# Patient Record
Sex: MALE | Race: WHITE | URBAN - METROPOLITAN AREA
[De-identification: names, ages, dates, MRNs, and addresses within clinical notes are randomized per-mention and may not be internally consistent; named-entity substitution may affect disease eponyms.]

---

## 2019-01-08 ENCOUNTER — TRANSFERRED RECORDS (OUTPATIENT)
Dept: HEALTH INFORMATION MANAGEMENT | Facility: CLINIC | Age: 64
End: 2019-01-08

## 2019-01-17 ENCOUNTER — TELEPHONE (OUTPATIENT)
Dept: DERMATOLOGY | Facility: CLINIC | Age: 64
End: 2019-01-17

## 2019-01-17 NOTE — TELEPHONE ENCOUNTER
Medical records requested from [facility]. Direct fax number given.

## 2019-01-17 NOTE — TELEPHONE ENCOUNTER
is full and [employer] is inactive. Will try to call back at a later time.

## 2019-01-17 NOTE — TELEPHONE ENCOUNTER
[facility] Call Center    Phone Message    May a detailed message be left on voicemail: yes    Reason for Call: Other: Dr Virgen is referring pt here to remove lentigo maligna on forehead. Please reach out to pt for scheduling     Action Taken: Message routed to:  [facility] ([facility] derm

## 2019-01-22 ENCOUNTER — PATIENT OUTREACH (OUTPATIENT)
Dept: CARE COORDINATION | Facility: CLINIC | Age: 64
End: 2019-01-22

## 2019-01-22 ENCOUNTER — OFFICE VISIT (OUTPATIENT)
Dept: DERMATOLOGY | Facility: CLINIC | Age: 64
End: 2019-01-22
Payer: COMMERCIAL

## 2019-01-22 DIAGNOSIS — C43.9 LENTIGO MALIGNA MELANOMA (H): Primary | ICD-10-CM

## 2019-01-22 RX ORDER — ALLOPURINOL 300 MG/1
300 TABLET ORAL DAILY
COMMUNITY
Start: 2018-12-09

## 2019-01-22 RX ORDER — METOPROLOL SUCCINATE 100 MG/1
100 TABLET, EXTENDED RELEASE ORAL DAILY
COMMUNITY
Start: 2018-12-09

## 2019-01-22 RX ORDER — LISINOPRIL 20 MG/1
1 TABLET ORAL DAILY
COMMUNITY
Start: 2018-04-09

## 2019-01-22 ASSESSMENT — PAIN SCALES - GENERAL: PAINLEVEL: NO PAIN (0)

## 2019-01-22 NOTE — LETTER
1/22/2019       RE: Ector Henry  [address] [facility] [address]     Dear Colleague,    Thank you for referring your patient, Ector Henry, to the [facility] DERMATOLOGIC SURGERY at [facility]. Please see a copy of my visit note below.    [facility] Dermatology Note    Dermatology Surgery Clinic  [facility]  [address] [place] [address]    Dermatology Problem List:  1. Lentigo maligna, forehead. MMS date TBD    Encounter Date: Jan 22, 2019    CC:  Chief Complaint   Patient presents with     Consult For     lentigo maligna forehead     History of Present Illness:  Mr. 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 This lesion was biopsied and diagnosed during his previous visit with Dr. Virgen at [place] on 01/17/2019. During that visit there were several NUBs discovered, but resulted in benign lesions. The patient reports no other painful, bleeding, nonhealing, or pruritic lesions, and denies new or changing moles.    Past Medical History:   There is no problem list on file for this patient.    History reviewed. No pertinent past medical history.  History reviewed. No pertinent surgical history.    Social History:  Social History     Socioeconomic History     Marital status: Single     Spouse name: None     Number of children: None     Years of education: None     Highest education level: None   Social Needs     Financial resource strain: None     Food insecurity - worry: None     Food insecurity - inability: None     Transportation needs - medical: None     Transportation needs - non-medical: None   Occupational History     None   Tobacco Use     Smoking status: Never Smoker     Smokeless tobacco: Never Used   Substance and Sexual Activity     Alcohol use: None     Drug use: None     Sexual activity: None    Other Topics Concern     Parent/sibling w/ CABG, MI or angioplasty before 65F 55M? Not Asked   Social History Narrative     None       Family History:  History reviewed. No pertinent family history.     Medications:  Current Outpatient Medications   Medication Sig Dispense Refill     lisinopril (PRINIVIL/ZESTRIL) 20 MG tablet Take 1 tablet by mouth daily       allopurinol (ZYLOPRIM) 300 MG tablet Take 300 mg by mouth daily       metoprolol succinate ER (TOPROL-XL) 100 MG 24 hr tablet Take 100 mg by mouth daily         Not on File    Review of Systems:  -as per HPI.  -Otherwise nml state of health. No other skin concerns.    Physical exam:  Vitals: There were no vitals taken for this visit.  GEN: This is a well developed, well-nourished male in no acute distress, in a pleasant mood.    SKIN: Focused examination of the face was performed.  - Ill-defined 1.5cm pink-to-brown patch on the R forhead  - No other lesions of concern on areas examined.     Impression/Plan:  1. Lentigo Melanoma of the R forehead. Mohs surgery recommended.    Discussed nature of LM with patient.     Risks, benefits, and process of Mohs micrographic surgery were discussed including possibility of damage to surrounding anatomical structures and infection. Patient is comfortable proceeding with the surgery; consent form was obtained. He will be scheduled at his convenience.    Follow-up as scheduled for MMS.     Staff Involved:    Scribe Disclosure  I, Dileep Sanchez, am serving as a scribe to document services personally performed by Dr. Ferny Olivas, based on data collection and the provider's statements to me.     Attending Attestation  I attest that the Scribe recorded the interview and exam that I personally performed.  I have reviewed the note and edited it as necessary.    Ferny Olivas M.D.    Director of Dermatologic Surgery  Department of Dermatology  [facility]

## 2019-01-22 NOTE — PATIENT INSTRUCTIONS
Mohs Cutaneous Micrographic Surgery Unit  Dr. Olivas, DO      Pre-Surgical Instruction Sheet    1. Expect to be here majority of the morning.  The Mohs surgical procedure can be an extensive surgery requiring multiple stages. Each stage may take 1-2 hours.    ? You may eat breakfast  ? You may bring snacks or beverages with you  ? Take all normal medications morning of surgery -- unless otherwise indicated by your physician  ? If you have an artificial heart valve, or joint replacement within two years, we will prescribe an antibiotic. Please take one hour prior to surgery.    2. You will need a  to be with you if your surgical site is close to your eyes. You can get swelling/bruising immediately after surgery and will go home with a big bulky bandage that could obstruct your view of driving safely.    3. Wear comfortable clothing -- preferably a button down shirt or a loose fitted shirt. (to avoid pulling over pressure bandage off when you get home) If your surgery site is on your leg, try wearing loose fitted pants. If your surgery site is on your arm, try wearing a short-sleeve shirt.    4. Bring reading material or other items to help pass time. We do have internet access available if you own a laptop, iPad, etc.)    5. Women - do NOT wear make-up. We will be washing it off anyone needing surgery on the face    6. Do NOT stop blood thinning medication unless instructed to do so by your surgeon. This will include: Warfarin, Coumadin, Jantoven, Aspirin, Plavix and Pradaxa. If you are taking Warfarin or Coumadin, please have your INR blood test results sent to our office no more than 7 days prior to your surgery.     7. Tylenol is a good alternative to take for headaches and pain, and can be taken throughout your surgery and postoperative recovery.    8. If your surgery is on your trunk, arms, hands, legs, feet, fingernail or a toenail, please wash the area with Hibiclens, an over-the-counter antiseptic soap,  the night before and morning of your surgery.     If you have any questions, please feel free to contact us.    Phone numbers:  During business hours (M-F 8:00-4:30 p.m.)  [facility]:  [phone] - select option 1 for appt. desk  [phone] - select option 3 for nurse triage line  [facility]:   [phone] - goes straight to call center   ---------------------------------------------------------  Evenings/Weekends/Holidays  Hospital - [phone]   TTY for hearing xvhelrcv-[phone]  *Ask  to page dermatologist on-call  Emergency Ngft-[phone]  TTY for hearing impaired- [phone]

## 2019-01-22 NOTE — NURSING NOTE
Chief Complaint   Patient presents with     Consult For     lentigo maligna forehead     Siobhan Toribio, EMT

## 2019-01-22 NOTE — PROGRESS NOTES
[facility] Dermatology Note    Dermatology Surgery Clinic  [facility]  [facility]  [address] [place] [address]    Dermatology Problem List:  1. Lentigo maligna, forehead. [facility] date TBD    Encounter Date: Jan 22, 2019    CC:  Chief Complaint   Patient presents with     Consult For     lentigo maligna forehead     History of Present Illness:  Mr. 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 This lesion was biopsied and diagnosed during his previous visit with Dr. Virgen at [facility] on 01/17/2019. During that visit there were several NUBs discovered, but resulted in benign lesions. The patient reports no other painful, bleeding, nonhealing, or pruritic lesions, and denies new or changing moles.    Past Medical History:   There is no problem list on file for this patient.    History reviewed. No pertinent past medical history.  History reviewed. No pertinent surgical history.    Social History:  Social History     Socioeconomic History     Marital status: Single     Spouse name: None     Number of children: None     Years of education: None     Highest education level: None   Social Needs     Financial resource strain: None     Food insecurity - worry: None     Food insecurity - inability: None     Transportation needs - medical: None     Transportation needs - non-medical: None   Occupational History     None   Tobacco Use     Smoking status: Never Smoker     Smokeless tobacco: Never Used   Substance and Sexual Activity     Alcohol use: None     Drug use: None     Sexual activity: None   Other Topics Concern     Parent/sibling w/ CABG, MI or angioplasty before 65F 55M? Not Asked   Social History Narrative     None       Family History:  History reviewed. No pertinent family history.     Medications:  Current Outpatient Medications   Medication Sig Dispense Refill      lisinopril (PRINIVIL/ZESTRIL) 20 MG tablet Take 1 tablet by mouth daily       allopurinol (ZYLOPRIM) 300 MG tablet Take 300 mg by mouth daily       metoprolol succinate ER (TOPROL-XL) 100 MG 24 hr tablet Take 100 mg by mouth daily         Not on File    Review of Systems:  -as per HPI.  -Otherwise nml state of health. No other skin concerns.    Physical exam:  Vitals: There were no vitals taken for this visit.  GEN: This is a well developed, well-nourished male in no acute distress, in a pleasant mood.    SKIN: Focused examination of the face was performed.  - Ill-defined 1.5cm pink-to-brown patch on the R forhead  - No other lesions of concern on areas examined.     Impression/Plan:  1. Lentigo Melanoma of the R forehead. Mohs surgery recommended.    Discussed nature of LM with patient.     Risks, benefits, and process of Mohs micrographic surgery were discussed including possibility of damage to surrounding anatomical structures and infection. Patient is comfortable proceeding with the surgery; consent form was obtained. He will be scheduled at his convenience.      Follow-up as scheduled for MMS.       Staff Involved:    Scribe Disclosure  I, Dileep Sanchez, am serving as a scribe to document services personally performed by Dr. Ferny Olivas, based on data collection and the provider's statements to me.     Attending Attestation  I attest that the Scribe recorded the interview and exam that I personally performed.  I have reviewed the note and edited it as necessary.    Ferny Olivas M.D.    Director of Dermatologic Surgery  [facility] D[facility]  [facility]

## 2019-01-30 ENCOUNTER — OFFICE VISIT (OUTPATIENT)
Dept: DERMATOLOGY | Facility: CLINIC | Age: 64
End: 2019-01-30
Payer: COMMERCIAL

## 2019-01-30 VITALS — HEART RATE: 57 BPM | DIASTOLIC BLOOD PRESSURE: 104 MMHG | SYSTOLIC BLOOD PRESSURE: 147 MMHG

## 2019-01-30 DIAGNOSIS — D03.39: Primary | ICD-10-CM

## 2019-01-30 RX ORDER — TRAMADOL HYDROCHLORIDE 50 MG/1
50 TABLET ORAL EVERY 6 HOURS PRN
Qty: 15 TABLET | Refills: 0 | Status: SHIPPED | OUTPATIENT
Start: 2019-01-30 | End: 2019-02-02

## 2019-01-30 RX ORDER — TRAMADOL HYDROCHLORIDE 50 MG/1
50 TABLET ORAL EVERY 6 HOURS PRN
Qty: 15 TABLET | Refills: 0 | Status: SHIPPED | OUTPATIENT
Start: 2019-01-30 | End: 2019-01-30

## 2019-01-30 ASSESSMENT — PAIN SCALES - GENERAL: PAINLEVEL: NO PAIN (0)

## 2019-01-30 NOTE — Clinical Note
Needs immunostaining codes.  They don't come up when I search.  If you want to email me the CPT I will stop sending these to you.  Hope you're well and thanks as always!

## 2019-01-30 NOTE — PATIENT INSTRUCTIONS
Wound Care Instructions  I will experience scar, altered skin color, bleeding, swelling, pain, crusting and redness. I may experience altered sensation. Risks are excessive bleeding, infection, muscle weakness, thick (hypertrophic or keloidal) scar, and recurrence,. A second procedure may be recommended to obtain the best cosmetic or functional result.  Possible complications of any surgical procedure are bleeding, infection, scarring, alteration in skin color and sensation, muscle weakness in the area, wound dehiscence or seperation, or recurrence of the lesion or disease. On occasion, after healing, a secondary procedure or revision may be recommended in order to obtain the best cosmetic or functional result.   After your surgery, a pressure bandage will be placed over the area that has sutures. This will help prevent bleeding. Please follow these instructions, as they will help you to prevent complications as your wound heals.  For the First 24 hours After Surgery:  1. Leave the pressure bandage on and keep it dry. If it should come loose, you may retape it, but do not take it off.  2. Relax and take it easy. Do not do any vigorous exercise, heavy lifting, or bending forward. This could cause the wound to bleed.  3. Post-operative pain is usually mild. You may take plain or extra strength Tylenol every 4 hours as needed (do not take more than 4,000mg in one day). Do not take any medicine that contains aspirin, ibuprofen or motrin unless you have been recommended these by a doctor.  Avoid alcohol and vitamin E as these may increase your tendency to bleed.  4. You may put an ice pack around the bandaged area for 20 minutes every 2-3 hours. This may help reduce swelling, bruising, and pain. Make sure the ice pack is waterproof so that the pressure bandage does not get wet.   5. You may see a small amount of drainage or blood on your pressure bandage. This is normal. However, if drainage or bleeding continues or  saturates the bandage, you will need to apply firm pressure over the bandage with a washcloth for 15 minutes. If bleeding continues after applying pressure for 15 minutes then go to the nearest emergency room.  24 Hours After Surgery  Carefully remove the bandage and start daily wound care and dressing changes. You may also now shower and get the wound wet. Wash wound with a mild soap and water.  Use caution when washing the wound. Be gentle and do not let the forceful shower stream hit the wound directly.  PAT dry.  Daily Wound Care:  1. Wash wound with a mild soap and water.  Use caution when washing the wound, be gentle and do not let the forceful shower stream hit the wound directly.  2. PAT DRY.  3. Apply Vaseline (from a new container or tube) over the suture line with a Q-tip. It is very important to keep the wound continuously moist, as wounds heal best in a moist environment.  4.  Keep the site covered until sutures are removed, you can cover it with a Telfa (non-stick) dressing and tape or a band-aid.    5. If you are unable to keep wound covered, you must apply Vaseline every 2 - 3 hours (while awake) to ensure it is being kept moist for optimal healing. A dressing overnight is recommended to keep the area moist.   Call Us If:  1. You have pain that is not controlled with Tylenol.  2. You have signs or symptoms of an infection, such as: fever over 100 degrees F, redness, warmth, or foul-smelling or yellow/creamy drainage from the wound.  Who should I call with questions?    [facility]: [phone]     [facility]: [phone]    For urgent needs outside of business hours call the [facility] at [phone] and ask for the dermatology resident on call

## 2019-01-30 NOTE — PROGRESS NOTES
[facility] Mohs Dermatologic Surgery Procedure Note    Dermatology Surgery Clinic  [facility]  [facility] Center  [address] [place] [id number]    Date of Service:  Jan 30, 2019  Surgery: Mohs micrographic surgery    Primary Surgeon: Brendon  Assistant Surgeon: PALAK Cavazos    Case 1  Repair Type: local flap (advancement)  Repair Size: 7.2 x 5.7 cm  Suture Material: Fast Absorbing Gut 5-0  Tumor Type: Lentigo maligna  Location: right forehead  Derm-Path Accession #: [id number]  PreOp Size: 1.5 x 1.5 cm  PostOp Size: 3.8 x 2.9 cm  Mohs Accession #:  [id number]  Level of Defect: fascia    Procedure:  We discussed the principles of treatment and most likely complications including scarring, bleeding, infection, swelling, pain, crusting, nerve damage, large wound,  incomplete excision, wound dehiscence,  nerve damage, recurrence, and a second procedure may be recommended to obtain the best cosmetic or functional result.    Informed consent was obtained and the patient underwent the procedure as follows:  The patient was placed supine on the operating table.  The cancer was identified, outlined with a marker, and verified by the patient.  The entire surgical field was prepped with Hibiclens.  The surgical site was anesthetized using Lidocaine 1% with epi 1:100,000.    The area of clinically apparent tumor was debulked. The layer of tissue was then surgically excised using a #15 blade and was then transferred onto a specimen sheet maintaining the orientation of the specimen. Hemostasis was obtained using monopolar electrodessication. The wound site was then covered with a dressing while the tissue samples were processed for examination.    The excised tissue was transported to the Mohs histology laboratory maintaining the tissue orientation.  The debulking specimen was processed for vertical sections with MART-1 and H+E frozen sections.  The remainder of the debulking  block was sent for permanent sections to confirm lack of invasive melanoma.  The marginal  tissue specimen was relaxed so that the entire surgical margin was in a a single horizontal plane for sectioning and inked for precise mapping.  A precise reference map was drawn to reflect the sectioning of the specimen, colored inking of the margins, and orientation on the patient. The tissue was processed using horizontal sectioning of the base and continuous peripheral margins.  The histopathologic sections were reviewed in conjunction with the reference map.    Total blocks: 4    Total slides:  27    The debulking specimen showed residual MIS with no dermal melanocytes on MART-1 or H+E.  There were no cancer cells visualized on margin  Examination with MART-1 and H+E, therefore Mohs surgery was complete.    RECONSTRUCTION: ADVANCEMENT FLAP    Case(s) Specific Information:    CASE 1    PROCEDURE:  The patient was taken to the operative suite and placed in a supine position on the operating room table. The defect was identified and the advancement flap was planned using a marker. The area was infiltrated with Lidocaine 1% with epi 1:100,000.  The surgical field was cleansed with Hibiclens and draped with sterile drapes. The wound edges were then debeveled. The flap incisions were made along brow to the level of fat, and a cone was removed superior. The flap was undermined broadly in all directions. Hemostasis was obtained with  monopolar electrodessication. The flap was then advanced into the primary defect and secured with buried vertical mattress sutures. Redundant skin at either end of the flap was excised.    The wound edges were meticulously approximated with buried vertical mattress sutures and simple running sutures.    Repair Size: 7.2 x 5.7 cm    Sutures Used:  Deep: monocryl 4-0 Superficial: Fast Absorbing Gut 5-0   Anesthesia Visit Total (ml): 15 ml    The wound was cleansed with saline and ointment was applied  along the wound surface.     A sterile pressure dressing was applied. The patient will return to in 3 months for scar evaluation. Wound care instructions were given verbally and in writing. The patient left the operating suite in stable condition.  Patient was informed that additional refinement of the resulting surgical scar may be used as a second stage of this reconstruction.     The Attending Physician for key, major portions of the procedure and always immediately available.    Staff Involved:    Scribe Disclosure  I, Dileep Sanchez, am serving as a scribe to document services personally performed by Dr. Ferny Olivas, based on data collection and the provider's statements to me.     Attending attestation:  I personally performed the entire procedure.  I have reviewed the note and edited it as necessary, and agree with its contents.    Ferny Olivas M.D.    Director of Dermatologic Surgery  Department of Dermatology  [facility]    Dermatology Surgery Clinic  [facility] [place] [phone]

## 2019-01-30 NOTE — LETTER
1/30/2019       RE: Ector Henry  [id number] [facility] [address]     Dear Colleague,    Thank you for referring your patient, Ector Henry, to the [facility] DERMATOLOGIC SURGERY at [facility]. Please see a copy of my visit note below.    Formerly [facility] Mohs Dermatologic Surgery Procedure Note    Dermatology Surgery Clinic  Formerly [facility]  Clinics and Surgery Center  [address] [place] [id number]    Date of Service:  Jan 30, 2019  Surgery: Mohs micrographic surgery    Primary Surgeon: Brendon  Assistant Surgeon: PALAK Cavazos    Case 1  Repair Type: local flap (advancement)  Repair Size: 7.2 x 5.7 cm  Suture Material: Fast Absorbing Gut 5-0  Tumor Type: Lentigo maligna  Location: right forehead  Derm-Path Accession #: [id number]  PreOp Size: 1.5 x 1.5 cm  PostOp Size: 3.8 x 2.9 cm  Mohs Accession #:  [id number]  Level of Defect: fascia    Procedure:  We discussed the principles of treatment and most likely complications including scarring, bleeding, infection, swelling, pain, crusting, nerve damage, large wound,  incomplete excision, wound dehiscence,  nerve damage, recurrence, and a second procedure may be recommended to obtain the best cosmetic or functional result.    Informed consent was obtained and the patient underwent the procedure as follows:  The patient was placed supine on the operating table.  The cancer was identified, outlined with a marker, and verified by the patient.  The entire surgical field was prepped with Hibiclens.  The surgical site was anesthetized using Lidocaine 1% with epi 1:100,000.    The area of clinically apparent tumor was debulked. The layer of tissue was then surgically excised using a #15 blade and was then transferred onto a specimen sheet maintaining the orientation of the specimen. Hemostasis was obtained using monopolar electrodessication. The wound site was then covered with a dressing while  the tissue samples were processed for examination.    The excised tissue was transported to the Mohs histology laboratory maintaining the tissue orientation.  The debulking specimen was processed for vertical sections with MART-1 and H+E frozen sections.  The remainder of the debulking block was sent for permanent sections to confirm lack of invasive melanoma.  The marginal  tissue specimen was relaxed so that the entire surgical margin was in a a single horizontal plane for sectioning and inked for precise mapping.  A precise reference map was drawn to reflect the sectioning of the specimen, colored inking of the margins, and orientation on the patient. The tissue was processed using horizontal sectioning of the base and continuous peripheral margins.  The histopathologic sections were reviewed in conjunction with the reference map.    Total blocks: 4    Total slides:  27    The debulking specimen showed residual MIS with no dermal melanocytes on MART-1 or H+E.  There were no cancer cells visualized on margin  Examination with MART-1 and H+E, therefore Mohs surgery was complete.    RECONSTRUCTION: ADVANCEMENT FLAP    Case(s) Specific Information:    CASE 1    PROCEDURE:  The patient was taken to the operative suite and placed in a supine position on the operating room table. The defect was identified and the advancement flap was planned using a marker. The area was infiltrated with Lidocaine 1% with epi 1:100,000.  The surgical field was cleansed with Hibiclens and draped with sterile drapes. The wound edges were then debeveled. The flap incisions were made along brow to the level of fat, and a cone was removed superior. The flap was undermined broadly in all directions. Hemostasis was obtained with  monopolar electrodessication. The flap was then advanced into the primary defect and secured with buried vertical mattress sutures. Redundant skin at either end of the flap was excised.    The wound edges were  meticulously approximated with buried vertical mattress sutures and simple running sutures.    Repair Size: 7.2 x 5.7 cm    Sutures Used:  Deep: monocryl 4-0 Superficial: Fast Absorbing Gut 5-0   Anesthesia Visit Total (ml): 15 ml    The wound was cleansed with saline and ointment was applied along the wound surface.     A sterile pressure dressing was applied. The patient will return to in 3 months for scar evaluation. Wound care instructions were given verbally and in writing. The patient left the operating suite in stable condition.  Patient was informed that additional refinement of the resulting surgical scar may be used as a second stage of this reconstruction.     The Attending Physician for key, major portions of the procedure and always immediately available.    Staff Involved:    Scribe Disclosure  I, Dileep Sanchez, am serving as a scribe to document services personally performed by Dr. Ferny Olivas, based on data collection and the provider's statements to me.     Attending attestation:  I personally performed the entire procedure.  I have reviewed the note and edited it as necessary, and agree with its contents.    Ferny Olivas M.D.    Director of Dermatologic Surgery  Department of Dermatology  [facility]    Dermatology Surgery Clinic  [facility]  [address] [place], [place] [id number]

## 2019-01-30 NOTE — NURSING NOTE
Chief Complaint   Patient presents with     Skin Cancer     Ector is here today to have MOHS on the forehead for a lentigo maligna.      Shannon Abdul, CMA

## 2019-01-31 ENCOUNTER — TELEPHONE (OUTPATIENT)
Dept: DERMATOLOGY | Facility: CLINIC | Age: 64
End: 2019-01-31

## 2019-01-31 NOTE — TELEPHONE ENCOUNTER
Follow up call completed following Mohs procedure with Dr. Perales.     The following questions were asked:    What are you doing to manage your pain? Tramadol  Is it helping? Yes  Are you applying ice?  no  Have you had any noticeable bleeding through the bandage? no   Do you have any concerns? Nothing at this time.          Please call [phone] option [phone] if you have any additional questions.

## 2019-02-01 ENCOUNTER — TELEPHONE (OUTPATIENT)
Dept: DERMATOLOGY | Facility: CLINIC | Age: 64
End: 2019-02-01

## 2019-02-01 NOTE — TELEPHONE ENCOUNTER
Returned Ector's call and no answer. I left a message letting him know we received his message and concern and to please give us a call when he has time. Phone number provided.     Gloria Galan RN

## 2019-02-01 NOTE — TELEPHONE ENCOUNTER
[facility] Call Center    Phone Message    May a detailed message be left on voicemail: yes    Reason for Call: Other: The pt is saying he is having pooling of blood around his eye where the surgery was. Please call to discuss. Thanks.     Action Taken: Message routed to:  [facility] ([facility]

## 2019-02-01 NOTE — TELEPHONE ENCOUNTER
Ector returned my call and was wondering if the bruising around his lower eye is normal. He was surprised as the surgery was done above his eye. I assured him bruising around the entire eye is normal at this point. He had no further questions.     Gloria Galan RN

## 2019-02-04 LAB — COPATH REPORT: NORMAL

## 2019-02-04 NOTE — TELEPHONE ENCOUNTER
I called the patient back and he stated that everything is going well. He has no concerns but he would like to know when he can start doing push ups or lifting weights about 15 pound weights?   Shannon Abdul, CMA

## 2019-02-04 NOTE — TELEPHONE ENCOUNTER
Spoke to patient and relayed message below:    One week post surgery he is back to all resistance training without restriction. (Routing comment)      Patient confirmed information verbally and thanked nurse for call.  Mallory Mason LPN